# Patient Record
Sex: FEMALE | Race: WHITE | NOT HISPANIC OR LATINO
[De-identification: names, ages, dates, MRNs, and addresses within clinical notes are randomized per-mention and may not be internally consistent; named-entity substitution may affect disease eponyms.]

---

## 2017-08-01 ENCOUNTER — APPOINTMENT (OUTPATIENT)
Dept: NEUROSURGERY | Facility: CLINIC | Age: 72
End: 2017-08-01
Payer: MEDICARE

## 2017-08-01 VITALS
BODY MASS INDEX: 22.15 KG/M2 | DIASTOLIC BLOOD PRESSURE: 65 MMHG | HEIGHT: 63 IN | SYSTOLIC BLOOD PRESSURE: 140 MMHG | WEIGHT: 125 LBS

## 2017-08-01 PROBLEM — Z00.00 ENCOUNTER FOR PREVENTIVE HEALTH EXAMINATION: Status: ACTIVE | Noted: 2017-08-01

## 2017-08-01 PROCEDURE — 99204 OFFICE O/P NEW MOD 45 MIN: CPT

## 2017-08-01 PROCEDURE — 99214 OFFICE O/P EST MOD 30 MIN: CPT

## 2017-08-01 RX ORDER — METRONIDAZOLE 500 MG/1
500 TABLET ORAL
Refills: 0 | Status: ACTIVE | COMMUNITY

## 2017-08-01 RX ORDER — HYDROCODONE BITARTRATE AND ACETAMINOPHEN 7.5; 3 MG/1; MG/1
7.5-3 TABLET ORAL
Refills: 0 | Status: ACTIVE | COMMUNITY

## 2017-08-01 RX ORDER — DICLOFENAC SODIUM 10 MG/G
1 GEL TOPICAL
Qty: 1 | Refills: 0 | Status: ACTIVE | COMMUNITY
Start: 2017-08-01 | End: 1900-01-01

## 2017-08-01 RX ORDER — FLUCONAZOLE 200 MG/1
200 TABLET ORAL
Refills: 0 | Status: ACTIVE | COMMUNITY

## 2017-08-01 RX ORDER — RIFAXIMIN 550 MG/1
550 TABLET ORAL
Refills: 0 | Status: ACTIVE | COMMUNITY

## 2017-08-01 RX ORDER — ZOLPIDEM TARTRATE 5 MG/1
5 TABLET, FILM COATED ORAL
Refills: 0 | Status: ACTIVE | COMMUNITY

## 2017-08-01 RX ORDER — CARBAMAZEPINE 200 MG/1
200 TABLET, EXTENDED RELEASE ORAL
Refills: 0 | Status: ACTIVE | COMMUNITY

## 2017-08-01 RX ORDER — LEVOTHYROXINE SODIUM 0.03 MG/1
25 TABLET ORAL
Refills: 0 | Status: ACTIVE | COMMUNITY

## 2017-08-01 RX ORDER — MINOCYCLINE HYDROCHLORIDE 100 MG/1
100 CAPSULE, COATED PELLETS ORAL
Refills: 0 | Status: ACTIVE | COMMUNITY

## 2017-08-01 RX ORDER — DOXYCYCLINE 150 MG/1
150 TABLET, FILM COATED ORAL
Refills: 0 | Status: ACTIVE | COMMUNITY

## 2017-08-01 RX ORDER — ENOXAPARIN SODIUM 80 MG/.8ML
80 INJECTION SUBCUTANEOUS
Refills: 0 | Status: ACTIVE | COMMUNITY

## 2017-08-01 RX ORDER — PILOCARPINE HYDROCHLORIDE 5 MG/1
5 TABLET, FILM COATED ORAL
Refills: 0 | Status: ACTIVE | COMMUNITY

## 2017-08-01 RX ORDER — AZITHROMYCIN DIHYDRATE 250 MG/1
250 TABLET, FILM COATED ORAL
Refills: 0 | Status: ACTIVE | COMMUNITY

## 2017-08-01 RX ORDER — MONTELUKAST SODIUM 10 MG/1
10 TABLET, FILM COATED ORAL
Refills: 0 | Status: ACTIVE | COMMUNITY

## 2017-08-01 RX ORDER — NYSTATIN 500000 [USP'U]/1
500000 TABLET, FILM COATED ORAL
Refills: 0 | Status: ACTIVE | COMMUNITY

## 2017-08-01 RX ORDER — DIAZEPAM 5 MG/1
5 TABLET ORAL
Refills: 0 | Status: ACTIVE | COMMUNITY

## 2017-08-01 RX ORDER — SOMATROPIN 0.2MG/0.25
0.2 KIT SUBCUTANEOUS
Refills: 0 | Status: ACTIVE | COMMUNITY

## 2017-08-01 RX ORDER — METHOCARBAMOL 750 MG/1
750 TABLET, FILM COATED ORAL
Refills: 0 | Status: ACTIVE | COMMUNITY

## 2017-09-05 ENCOUNTER — APPOINTMENT (OUTPATIENT)
Dept: NEUROSURGERY | Facility: CLINIC | Age: 72
End: 2017-09-05
Payer: MEDICARE

## 2017-09-05 VITALS
BODY MASS INDEX: 22.15 KG/M2 | HEIGHT: 63 IN | DIASTOLIC BLOOD PRESSURE: 60 MMHG | SYSTOLIC BLOOD PRESSURE: 145 MMHG | WEIGHT: 125 LBS

## 2017-09-05 DIAGNOSIS — M48.50XA COLLAPSED VERTEBRA, NOT ELSEWHERE CLASSIFIED, SITE UNSPECIFIED, INITIAL ENCOUNTER FOR FRACTURE: ICD-10-CM

## 2017-09-05 DIAGNOSIS — M81.0 AGE-RELATED OSTEOPOROSIS W/OUT CURRENT PATHOLOGICAL FRACTURE: ICD-10-CM

## 2017-09-05 DIAGNOSIS — M54.16 RADICULOPATHY, LUMBAR REGION: ICD-10-CM

## 2017-09-05 PROCEDURE — 99214 OFFICE O/P EST MOD 30 MIN: CPT

## 2017-09-06 ENCOUNTER — OUTPATIENT (OUTPATIENT)
Dept: OUTPATIENT SERVICES | Facility: HOSPITAL | Age: 72
LOS: 1 days | End: 2017-09-06

## 2017-09-20 ENCOUNTER — OUTPATIENT (OUTPATIENT)
Dept: OUTPATIENT SERVICES | Facility: HOSPITAL | Age: 72
LOS: 1 days | End: 2017-09-20

## 2017-09-27 ENCOUNTER — OUTPATIENT (OUTPATIENT)
Dept: OUTPATIENT SERVICES | Facility: HOSPITAL | Age: 72
LOS: 1 days | End: 2017-09-27

## 2017-10-04 ENCOUNTER — OUTPATIENT (OUTPATIENT)
Dept: OUTPATIENT SERVICES | Facility: HOSPITAL | Age: 72
LOS: 1 days | End: 2017-10-04

## 2022-10-13 ENCOUNTER — NON-APPOINTMENT (OUTPATIENT)
Age: 77
End: 2022-10-13

## 2022-10-17 ENCOUNTER — APPOINTMENT (OUTPATIENT)
Dept: NEUROSURGERY | Facility: CLINIC | Age: 77
End: 2022-10-17

## 2024-01-18 ENCOUNTER — APPOINTMENT (OUTPATIENT)
Dept: ORTHOPEDIC SURGERY | Facility: CLINIC | Age: 79
End: 2024-01-18
Payer: MEDICARE

## 2024-01-18 ENCOUNTER — APPOINTMENT (OUTPATIENT)
Dept: ORTHOPEDIC SURGERY | Facility: CLINIC | Age: 79
End: 2024-01-18

## 2024-01-18 VITALS — BODY MASS INDEX: 16.22 KG/M2 | HEIGHT: 64 IN | WEIGHT: 95 LBS

## 2024-01-18 PROCEDURE — 73060 X-RAY EXAM OF HUMERUS: CPT

## 2024-01-18 PROCEDURE — 99203 OFFICE O/P NEW LOW 30 MIN: CPT

## 2024-01-18 NOTE — ADDENDUM
[FreeTextEntry1] :  I, Kristyn Santoro wrote this note acting as a scribe for Dr. Keith Martinez on Jan 18, 2024.

## 2024-01-18 NOTE — DISCUSSION/SUMMARY
[de-identified] :  The underlying pathophysiology was reviewed with the patient. XR films were reviewed with the patient. Discussed at length the nature of the patient's condition. Risks and benefits discussed for surgery. Nature of the operation reviewed. Post-operative recovery and patient experience were reviewed.  The patient was informed that the 2 options available are to continue with the brace or to have surgical repair of the nonunion. She is at significant of risks  of the surgery including risk of infection, nonunion, malunion, radial nerve palsy among many other risks.  She wishes to proceed with surgery.  The patient was encouraged to take Calcium Citrate, Vitamin D3 and Vitamin C along with a high calcium diet is advised to promote bone health and healing.  All questions answered, understanding verbalized. Patient in agreement with plan of care.

## 2024-01-18 NOTE — PHYSICAL EXAM
[de-identified] :  Patient is WDWN, alert, and in no acute distress. Breathing is unlabored. She is grossly oriented to person, place, and time.   She was accompanied by Her  today.   She ambulates with a walker.  Right humerus:  Present in a humeral brace which was removed Obvious midshaft deformity with motion at fracture site Tenderness and edema noted Range of Motion: Shoulder 0-90 flexion Elbow  Wrist/hand FROM Sensation is normal [de-identified] :  AP and lateral views of the Right humerus were obtained today and revealed nonunion humeral shaft fracture.

## 2024-01-18 NOTE — HISTORY OF PRESENT ILLNESS
[de-identified] : Pt is a 77 y/o female with right humeral shaft fracture.  She fell in June and had a right displaced humeral shaft fracture.  She went to [facility] ED where the fracture was reduced.  A splint was applied.  She followed up with Dr. Dominguez.  She was treated in a fracture brace.  She states that she was doing well until October.  The fracture did not heal.  She was treated with an exogen bone stimulator but it did not help.  She presents today for surgical consultation. She is chronic narcotic pain medication 3-4x a day

## 2024-01-18 NOTE — END OF VISIT
[FreeTextEntry3] : All medical record entries made by the Scribe were at my, Dr. Keith Martinez MD., direction and personally dictated by me on 01/18/2024. I have personally reviewed the chart and agree that the record accurately reflects my personal performance of the history, physical exam, assessment and plan.

## 2024-01-23 ENCOUNTER — OUTPATIENT (OUTPATIENT)
Dept: OUTPATIENT SERVICES | Facility: HOSPITAL | Age: 79
LOS: 1 days | End: 2024-01-23
Payer: MEDICARE

## 2024-01-23 VITALS
WEIGHT: 95.02 LBS | OXYGEN SATURATION: 99 % | HEIGHT: 58 IN | TEMPERATURE: 98 F | RESPIRATION RATE: 12 BRPM | HEART RATE: 65 BPM | DIASTOLIC BLOOD PRESSURE: 67 MMHG | SYSTOLIC BLOOD PRESSURE: 134 MMHG

## 2024-01-23 DIAGNOSIS — S42.301A UNSPECIFIED FRACTURE OF SHAFT OF HUMERUS, RIGHT ARM, INITIAL ENCOUNTER FOR CLOSED FRACTURE: ICD-10-CM

## 2024-01-23 DIAGNOSIS — Z01.818 ENCOUNTER FOR OTHER PREPROCEDURAL EXAMINATION: ICD-10-CM

## 2024-01-23 DIAGNOSIS — S42.309A UNSPECIFIED FRACTURE OF SHAFT OF HUMERUS, UNSPECIFIED ARM, INITIAL ENCOUNTER FOR CLOSED FRACTURE: ICD-10-CM

## 2024-01-23 DIAGNOSIS — Z90.49 ACQUIRED ABSENCE OF OTHER SPECIFIED PARTS OF DIGESTIVE TRACT: Chronic | ICD-10-CM

## 2024-01-23 DIAGNOSIS — Z98.890 OTHER SPECIFIED POSTPROCEDURAL STATES: Chronic | ICD-10-CM

## 2024-01-23 DIAGNOSIS — Z90.89 ACQUIRED ABSENCE OF OTHER ORGANS: Chronic | ICD-10-CM

## 2024-01-23 DIAGNOSIS — Z90.710 ACQUIRED ABSENCE OF BOTH CERVIX AND UTERUS: Chronic | ICD-10-CM

## 2024-01-23 DIAGNOSIS — Z90.5 ACQUIRED ABSENCE OF KIDNEY: Chronic | ICD-10-CM

## 2024-01-23 LAB
ALBUMIN SERPL ELPH-MCNC: 2.8 G/DL — LOW (ref 3.3–5)
ALP SERPL-CCNC: 156 U/L — HIGH (ref 30–120)
ALT FLD-CCNC: 26 U/L — SIGNIFICANT CHANGE UP (ref 10–60)
ANION GAP SERPL CALC-SCNC: 7 MMOL/L — SIGNIFICANT CHANGE UP (ref 5–17)
AST SERPL-CCNC: 22 U/L — SIGNIFICANT CHANGE UP (ref 10–40)
BILIRUB SERPL-MCNC: 0.2 MG/DL — SIGNIFICANT CHANGE UP (ref 0.2–1.2)
BLD GP AB SCN SERPL QL: SIGNIFICANT CHANGE UP
BUN SERPL-MCNC: 31 MG/DL — HIGH (ref 7–23)
CALCIUM SERPL-MCNC: 8.9 MG/DL — SIGNIFICANT CHANGE UP (ref 8.4–10.5)
CHLORIDE SERPL-SCNC: 101 MMOL/L — SIGNIFICANT CHANGE UP (ref 96–108)
CO2 SERPL-SCNC: 29 MMOL/L — SIGNIFICANT CHANGE UP (ref 22–31)
CREAT SERPL-MCNC: 0.78 MG/DL — SIGNIFICANT CHANGE UP (ref 0.5–1.3)
EGFR: 78 ML/MIN/1.73M2 — SIGNIFICANT CHANGE UP
GLUCOSE SERPL-MCNC: 94 MG/DL — SIGNIFICANT CHANGE UP (ref 70–99)
POTASSIUM SERPL-MCNC: 4.5 MMOL/L — SIGNIFICANT CHANGE UP (ref 3.5–5.3)
POTASSIUM SERPL-SCNC: 4.5 MMOL/L — SIGNIFICANT CHANGE UP (ref 3.5–5.3)
PROT SERPL-MCNC: 6.9 G/DL — SIGNIFICANT CHANGE UP (ref 6–8.3)
SODIUM SERPL-SCNC: 137 MMOL/L — SIGNIFICANT CHANGE UP (ref 135–145)

## 2024-01-23 PROCEDURE — G0463: CPT

## 2024-01-23 PROCEDURE — 93005 ELECTROCARDIOGRAM TRACING: CPT

## 2024-01-23 PROCEDURE — 93010 ELECTROCARDIOGRAM REPORT: CPT

## 2024-01-23 RX ORDER — RIFAXIMIN 200 MG/1
1 TABLET ORAL
Refills: 0 | DISCHARGE

## 2024-01-23 NOTE — H&P PST ADULT - HISTORY OF PRESENT ILLNESS
77 yo female reports pain in right humerus s/p fall injury 7/2023 for which she was treated with arm brace.  Her pain rating is 7/10 at worst when she is laying down.  She is scheduled for open reduction internal fixation right humeral nonunion with allograft and iliac crest bone graft on 1/26/2024 79 yo female reports pain in right humerus s/p fall injury 7/2023 for which she was treated with arm brace.  Her pain rating is 7/10 at worst when she is laying down.  She is scheduled for open reduction internal fixation right humeral nonunion with allograft and iliac crest bone graft on 1/26/2024 @ [facility]

## 2024-01-23 NOTE — H&P PST ADULT - NSICDXPASTMEDICALHX_GEN_ALL_CORE_FT
PAST MEDICAL HISTORY:  Acquired absence of right kidney     Allergy to gluten     Cancer of kidney     Chronic lower back pain     Fracture of shaft of right humerus     GERD (gastroesophageal reflux disease)     Hearing loss     History of antiphospholipid syndrome     History of hypercoagulable state     History of Lyme disease     Hypothyroidism     IBS (irritable bowel syndrome)     Lactose intolerance     Opioid use     Osteoporosis     Sjogrens syndrome     TIA due to embolism     Uses roller walker

## 2024-01-23 NOTE — H&P PST ADULT - NSICDXFAMILYHX_GEN_ALL_CORE_FT
FAMILY HISTORY:  Father  Still living? No  Family history of heart disease, Age at diagnosis: Age Unknown    Mother  Still living? No  Family history of COPD (chronic obstructive pulmonary disease), Age at diagnosis: Age Unknown    Sibling  Still living? No  Family history of heart disease, Age at diagnosis: Age Unknown

## 2024-01-23 NOTE — H&P PST ADULT - PROBLEM SELECTOR PLAN 1
ORIF right humeral non allograft and iliac crest bone marrow aspirate is plannned for 1/26/2024  Diagnostic testing performed  Patient was asked to obtain medical clearance  patient has obtained hematology clearance and instructions for lovenox  Patient is advised to consult pain management medication prescriber for instructions for post op pain relief  Pre op instructions were reviewed and given in writing  Best wishes offered

## 2024-01-23 NOTE — H&P PST ADULT - ASSESSMENT
chest pain/fever 77 yo female is scheduled for open reduction internal fixation humeral nonunion with allograft and iliac crest bone marrow aspirate on 1/26/2024

## 2024-01-23 NOTE — H&P PST ADULT - NSICDXPASTSURGICALHX_GEN_ALL_CORE_FT
PAST SURGICAL HISTORY:  History of appendectomy     History of hysterectomy     History of microdiscectomy     History of nephrectomy     History of open reduction and internal fixation (ORIF) procedure     History of tonsillectomy     Status post de Quervain's release surgery

## 2024-01-25 ENCOUNTER — TRANSCRIPTION ENCOUNTER (OUTPATIENT)
Age: 79
End: 2024-01-25

## 2024-01-26 ENCOUNTER — APPOINTMENT (OUTPATIENT)
Dept: ORTHOPEDIC SURGERY | Facility: HOSPITAL | Age: 79
End: 2024-01-26

## 2024-01-26 ENCOUNTER — RESULT REVIEW (OUTPATIENT)
Age: 79
End: 2024-01-26

## 2024-01-26 ENCOUNTER — OUTPATIENT (OUTPATIENT)
Dept: OUTPATIENT SERVICES | Facility: HOSPITAL | Age: 79
LOS: 1 days | End: 2024-01-26
Payer: MEDICARE

## 2024-01-26 ENCOUNTER — TRANSCRIPTION ENCOUNTER (OUTPATIENT)
Age: 79
End: 2024-01-26

## 2024-01-26 VITALS
DIASTOLIC BLOOD PRESSURE: 75 MMHG | HEART RATE: 96 BPM | RESPIRATION RATE: 16 BRPM | SYSTOLIC BLOOD PRESSURE: 127 MMHG | TEMPERATURE: 98 F | OXYGEN SATURATION: 100 %

## 2024-01-26 DIAGNOSIS — Z01.818 ENCOUNTER FOR OTHER PREPROCEDURAL EXAMINATION: ICD-10-CM

## 2024-01-26 DIAGNOSIS — Z98.890 OTHER SPECIFIED POSTPROCEDURAL STATES: Chronic | ICD-10-CM

## 2024-01-26 DIAGNOSIS — S42.309A UNSPECIFIED FRACTURE OF SHAFT OF HUMERUS, UNSPECIFIED ARM, INITIAL ENCOUNTER FOR CLOSED FRACTURE: ICD-10-CM

## 2024-01-26 DIAGNOSIS — Z90.5 ACQUIRED ABSENCE OF KIDNEY: Chronic | ICD-10-CM

## 2024-01-26 DIAGNOSIS — Z90.49 ACQUIRED ABSENCE OF OTHER SPECIFIED PARTS OF DIGESTIVE TRACT: Chronic | ICD-10-CM

## 2024-01-26 DIAGNOSIS — Z90.710 ACQUIRED ABSENCE OF BOTH CERVIX AND UTERUS: Chronic | ICD-10-CM

## 2024-01-26 DIAGNOSIS — Z90.89 ACQUIRED ABSENCE OF OTHER ORGANS: Chronic | ICD-10-CM

## 2024-01-26 LAB
ABO RH CONFIRMATION: SIGNIFICANT CHANGE UP
HCT VFR BLD CALC: 41.9 % — SIGNIFICANT CHANGE UP (ref 34.5–45)
HGB BLD-MCNC: 13.9 G/DL — SIGNIFICANT CHANGE UP (ref 11.5–15.5)
MCHC RBC-ENTMCNC: 29.7 PG — SIGNIFICANT CHANGE UP (ref 27–34)
MCHC RBC-ENTMCNC: 33.2 GM/DL — SIGNIFICANT CHANGE UP (ref 32–36)
MCV RBC AUTO: 89.5 FL — SIGNIFICANT CHANGE UP (ref 80–100)
NRBC # BLD: 0 /100 WBCS — SIGNIFICANT CHANGE UP (ref 0–0)
PLATELET # BLD AUTO: 244 K/UL — SIGNIFICANT CHANGE UP (ref 150–400)
RBC # BLD: 4.68 M/UL — SIGNIFICANT CHANGE UP (ref 3.8–5.2)
RBC # FLD: 17.2 % — HIGH (ref 10.3–14.5)
WBC # BLD: 10.76 K/UL — HIGH (ref 3.8–10.5)
WBC # FLD AUTO: 10.76 K/UL — HIGH (ref 3.8–10.5)

## 2024-01-26 PROCEDURE — 24430 RPR NON/MAL HUMERUS WO GRAFT: CPT | Mod: AS,RT

## 2024-01-26 PROCEDURE — 88305 TISSUE EXAM BY PATHOLOGIST: CPT | Mod: 26

## 2024-01-26 PROCEDURE — 24435 RPR NON/MAL HUM WITH AGRFT: CPT | Mod: 22,RT

## 2024-01-26 PROCEDURE — 73060 X-RAY EXAM OF HUMERUS: CPT | Mod: 26,RT

## 2024-01-26 DEVICE — SCREW LOKG SLF-TPNG W/ STARDRIVE RECESS 3.5X38MM: Type: IMPLANTABLE DEVICE | Site: RIGHT | Status: FUNCTIONAL

## 2024-01-26 DEVICE — SCREW CORT S-T 3.5X30MM: Type: IMPLANTABLE DEVICE | Site: RIGHT | Status: FUNCTIONAL

## 2024-01-26 DEVICE — SCREW LOKG SLF-TPNG W/ STARDRIVE RECESS 3.5X30MM: Type: IMPLANTABLE DEVICE | Site: RIGHT | Status: FUNCTIONAL

## 2024-01-26 DEVICE — SCREW CORT S-T 3.5X34MM: Type: IMPLANTABLE DEVICE | Site: RIGHT | Status: FUNCTIONAL

## 2024-01-26 DEVICE — SCREW CORT S-T 3.5X26MM: Type: IMPLANTABLE DEVICE | Site: RIGHT | Status: FUNCTIONAL

## 2024-01-26 DEVICE — SCREW CORT S-T 3.5X40MM: Type: IMPLANTABLE DEVICE | Site: RIGHT | Status: FUNCTIONAL

## 2024-01-26 DEVICE — SCREW CORT S-T 3.5X36MM: Type: IMPLANTABLE DEVICE | Site: RIGHT | Status: FUNCTIONAL

## 2024-01-26 DEVICE — SCREW LOKG SLF-TPNG W/ STARDRIVE RECESS 3.5X24MM: Type: IMPLANTABLE DEVICE | Site: RIGHT | Status: FUNCTIONAL

## 2024-01-26 DEVICE — IMPLANTABLE DEVICE: Type: IMPLANTABLE DEVICE | Site: RIGHT | Status: FUNCTIONAL

## 2024-01-26 DEVICE — SCREW LOKG SLF-TPNG W/ STARDRIVE RECESS 3.5X22MM: Type: IMPLANTABLE DEVICE | Site: RIGHT | Status: FUNCTIONAL

## 2024-01-26 DEVICE — SCREW LOKG SLF-TPNG W/ STARDRIVE RECESS 3.5X14MM: Type: IMPLANTABLE DEVICE | Site: RIGHT | Status: FUNCTIONAL

## 2024-01-26 DEVICE — SCREW CORT S-T 3.5X24MM: Type: IMPLANTABLE DEVICE | Site: RIGHT | Status: FUNCTIONAL

## 2024-01-26 DEVICE — PLATE LCP 12H 3.5X163MM: Type: IMPLANTABLE DEVICE | Site: RIGHT | Status: FUNCTIONAL

## 2024-01-26 DEVICE — SCREW CORT S-T 3.5X32MM: Type: IMPLANTABLE DEVICE | Site: RIGHT | Status: FUNCTIONAL

## 2024-01-26 DEVICE — SURGICEL NU-KNIT 6 X 9": Type: IMPLANTABLE DEVICE | Site: RIGHT | Status: FUNCTIONAL

## 2024-01-26 DEVICE — SCR LOK S-T 3.5X28MM SS: Type: IMPLANTABLE DEVICE | Site: RIGHT | Status: FUNCTIONAL

## 2024-01-26 DEVICE — SCREW LOKG SLF-TPNG W/ STARDRIVE RECESS 3.5X20MM: Type: IMPLANTABLE DEVICE | Site: RIGHT | Status: FUNCTIONAL

## 2024-01-26 DEVICE — SCREW LOKG SLF-TPNG W/ STARDRIVE RECESS 3.5X26MM: Type: IMPLANTABLE DEVICE | Site: RIGHT | Status: FUNCTIONAL

## 2024-01-26 RX ORDER — CARBAMAZEPINE 200 MG
200 TABLET ORAL THREE TIMES A DAY
Refills: 0 | Status: DISCONTINUED | OUTPATIENT
Start: 2024-01-26 | End: 2024-02-09

## 2024-01-26 RX ORDER — FENTANYL CITRATE 50 UG/ML
1 INJECTION INTRAVENOUS
Refills: 0 | DISCHARGE

## 2024-01-26 RX ORDER — LEVOTHYROXINE SODIUM 125 MCG
88 TABLET ORAL DAILY
Refills: 0 | Status: DISCONTINUED | OUTPATIENT
Start: 2024-01-26 | End: 2024-02-09

## 2024-01-26 RX ORDER — DESLORATADINE 5 MG/1
1 TABLET, FILM COATED ORAL
Refills: 0 | DISCHARGE

## 2024-01-26 RX ORDER — HYDROMORPHONE HYDROCHLORIDE 2 MG/ML
0.5 INJECTION INTRAMUSCULAR; INTRAVENOUS; SUBCUTANEOUS
Refills: 0 | Status: DISCONTINUED | OUTPATIENT
Start: 2024-01-26 | End: 2024-01-27

## 2024-01-26 RX ORDER — ENOXAPARIN SODIUM 100 MG/ML
60 INJECTION SUBCUTANEOUS EVERY 24 HOURS
Refills: 0 | Status: DISCONTINUED | OUTPATIENT
Start: 2024-01-27 | End: 2024-02-09

## 2024-01-26 RX ORDER — ACETAMINOPHEN 500 MG
600 TABLET ORAL ONCE
Refills: 0 | Status: COMPLETED | OUTPATIENT
Start: 2024-01-27 | End: 2024-01-27

## 2024-01-26 RX ORDER — CALCITONIN SALMON 200 [IU]/ML
1 INJECTION, SOLUTION INTRAMUSCULAR
Refills: 0 | DISCHARGE

## 2024-01-26 RX ORDER — ONDANSETRON 8 MG/1
4 TABLET, FILM COATED ORAL EVERY 6 HOURS
Refills: 0 | Status: DISCONTINUED | OUTPATIENT
Start: 2024-01-26 | End: 2024-02-09

## 2024-01-26 RX ORDER — ONDANSETRON 8 MG/1
4 TABLET, FILM COATED ORAL ONCE
Refills: 0 | Status: DISCONTINUED | OUTPATIENT
Start: 2024-01-26 | End: 2024-01-27

## 2024-01-26 RX ORDER — CEFAZOLIN SODIUM 1 G
2000 VIAL (EA) INJECTION EVERY 8 HOURS
Refills: 0 | Status: COMPLETED | OUTPATIENT
Start: 2024-01-27 | End: 2024-01-27

## 2024-01-26 RX ORDER — CEFAZOLIN SODIUM 1 G
2000 VIAL (EA) INJECTION ONCE
Refills: 0 | Status: COMPLETED | OUTPATIENT
Start: 2024-01-26 | End: 2024-01-26

## 2024-01-26 RX ORDER — CARBAMAZEPINE 200 MG
1 TABLET ORAL
Refills: 0 | DISCHARGE

## 2024-01-26 RX ORDER — CHLORHEXIDINE GLUCONATE 213 G/1000ML
1 SOLUTION TOPICAL ONCE
Refills: 0 | Status: COMPLETED | OUTPATIENT
Start: 2024-01-26 | End: 2024-01-26

## 2024-01-26 RX ORDER — TRANEXAMIC ACID 100 MG/ML
1000 INJECTION, SOLUTION INTRAVENOUS ONCE
Refills: 0 | Status: DISCONTINUED | OUTPATIENT
Start: 2024-01-26 | End: 2024-02-09

## 2024-01-26 RX ORDER — PRIMIDONE 250 MG/1
250 TABLET ORAL EVERY 12 HOURS
Refills: 0 | Status: DISCONTINUED | OUTPATIENT
Start: 2024-01-26 | End: 2024-02-09

## 2024-01-26 RX ORDER — PANTOPRAZOLE SODIUM 20 MG/1
40 TABLET, DELAYED RELEASE ORAL
Refills: 0 | Status: DISCONTINUED | OUTPATIENT
Start: 2024-01-26 | End: 2024-02-09

## 2024-01-26 RX ORDER — LIOTHYRONINE SODIUM 25 UG/1
25 TABLET ORAL DAILY
Refills: 0 | Status: DISCONTINUED | OUTPATIENT
Start: 2024-01-26 | End: 2024-02-09

## 2024-01-26 RX ORDER — LIOTHYRONINE SODIUM 25 UG/1
1 TABLET ORAL
Refills: 0 | DISCHARGE

## 2024-01-26 RX ORDER — OXYCODONE HYDROCHLORIDE 5 MG/1
5 TABLET ORAL
Refills: 0 | Status: DISCONTINUED | OUTPATIENT
Start: 2024-01-26 | End: 2024-01-28

## 2024-01-26 RX ORDER — SODIUM CHLORIDE 9 MG/ML
1000 INJECTION, SOLUTION INTRAVENOUS
Refills: 0 | Status: DISCONTINUED | OUTPATIENT
Start: 2024-01-26 | End: 2024-02-09

## 2024-01-26 RX ORDER — BACLOFEN 100 %
1 POWDER (GRAM) MISCELLANEOUS
Refills: 0 | DISCHARGE

## 2024-01-26 RX ORDER — SODIUM CHLORIDE 9 MG/ML
1000 INJECTION, SOLUTION INTRAVENOUS
Refills: 0 | Status: DISCONTINUED | OUTPATIENT
Start: 2024-01-26 | End: 2024-01-27

## 2024-01-26 RX ORDER — POTASSIUM CHLORIDE 20 MEQ
1 PACKET (EA) ORAL
Refills: 0 | DISCHARGE

## 2024-01-26 RX ORDER — HYDROMORPHONE HYDROCHLORIDE 2 MG/ML
0.2 INJECTION INTRAMUSCULAR; INTRAVENOUS; SUBCUTANEOUS
Refills: 0 | Status: DISCONTINUED | OUTPATIENT
Start: 2024-01-26 | End: 2024-01-27

## 2024-01-26 RX ORDER — ENOXAPARIN SODIUM 100 MG/ML
100 INJECTION SUBCUTANEOUS
Refills: 0 | DISCHARGE

## 2024-01-26 RX ORDER — PILOCARPINE HCL
1 CRYSTALS MISCELLANEOUS
Refills: 0 | DISCHARGE

## 2024-01-26 RX ORDER — RIFAXIMIN 200 MG/1
1 TABLET ORAL
Refills: 0 | DISCHARGE

## 2024-01-26 RX ORDER — NYSTATIN 500MM UNIT
1 POWDER (EA) MISCELLANEOUS
Refills: 0 | DISCHARGE

## 2024-01-26 RX ORDER — NYSTATIN 500MM UNIT
500000 POWDER (EA) MISCELLANEOUS DAILY
Refills: 0 | Status: DISCONTINUED | OUTPATIENT
Start: 2024-01-26 | End: 2024-02-09

## 2024-01-26 RX ORDER — APREPITANT 80 MG/1
40 CAPSULE ORAL ONCE
Refills: 0 | Status: COMPLETED | OUTPATIENT
Start: 2024-01-26 | End: 2024-01-26

## 2024-01-26 RX ORDER — ACETAMINOPHEN 500 MG
650 TABLET ORAL EVERY 8 HOURS
Refills: 0 | Status: DISCONTINUED | OUTPATIENT
Start: 2024-01-27 | End: 2024-02-09

## 2024-01-26 RX ORDER — LEVOTHYROXINE SODIUM 125 MCG
1 TABLET ORAL
Refills: 0 | DISCHARGE

## 2024-01-26 RX ORDER — HYDROMORPHONE HYDROCHLORIDE 2 MG/ML
0.5 INJECTION INTRAMUSCULAR; INTRAVENOUS; SUBCUTANEOUS
Refills: 0 | Status: DISCONTINUED | OUTPATIENT
Start: 2024-01-26 | End: 2024-01-26

## 2024-01-26 RX ORDER — LORATADINE 10 MG/1
10 TABLET ORAL DAILY
Refills: 0 | Status: DISCONTINUED | OUTPATIENT
Start: 2024-01-26 | End: 2024-02-09

## 2024-01-26 RX ORDER — FAMOTIDINE 10 MG/ML
1 INJECTION INTRAVENOUS
Refills: 0 | DISCHARGE

## 2024-01-26 RX ORDER — OXYCODONE HYDROCHLORIDE 5 MG/1
10 TABLET ORAL
Refills: 0 | Status: DISCONTINUED | OUTPATIENT
Start: 2024-01-26 | End: 2024-01-27

## 2024-01-26 RX ORDER — PRIMIDONE 250 MG/1
1 TABLET ORAL
Refills: 0 | DISCHARGE

## 2024-01-26 RX ADMIN — APREPITANT 40 MILLIGRAM(S): 80 CAPSULE ORAL at 12:16

## 2024-01-26 RX ADMIN — CHLORHEXIDINE GLUCONATE 1 APPLICATION(S): 213 SOLUTION TOPICAL at 12:16

## 2024-01-26 RX ADMIN — HYDROMORPHONE HYDROCHLORIDE 0.2 MILLIGRAM(S): 2 INJECTION INTRAMUSCULAR; INTRAVENOUS; SUBCUTANEOUS at 21:01

## 2024-01-26 NOTE — ASU PREOP CHECKLIST - ADVANCE DIRECTIVE ADDRESSED/READDRESSED
Attempted to call family with update in plan.   Emperatriz Mcginnis MD  Colon And Rectal Surgery Fellow  [phone]    6/3/2017  2:01 PM       done

## 2024-01-26 NOTE — ASU PATIENT PROFILE, ADULT - FALL HARM RISK - RISK INTERVENTIONS

## 2024-01-26 NOTE — BRIEF OPERATIVE NOTE - NSICDXBRIEFPROCEDURE_GEN_ALL_CORE_FT
PROCEDURES:  Open reduction and internal fixation (ORIF) of fracture nonunion of humerus 26-Jan-2024 15:00:27  Franck Henderson

## 2024-01-27 ENCOUNTER — TRANSCRIPTION ENCOUNTER (OUTPATIENT)
Age: 79
End: 2024-01-27

## 2024-01-27 LAB
ANION GAP SERPL CALC-SCNC: 8 MMOL/L — SIGNIFICANT CHANGE UP (ref 5–17)
BASOPHILS # BLD AUTO: 0.07 K/UL — SIGNIFICANT CHANGE UP (ref 0–0.2)
BASOPHILS NFR BLD AUTO: 0.9 % — SIGNIFICANT CHANGE UP (ref 0–2)
BUN SERPL-MCNC: 21 MG/DL — SIGNIFICANT CHANGE UP (ref 7–23)
CALCIUM SERPL-MCNC: 8.5 MG/DL — SIGNIFICANT CHANGE UP (ref 8.4–10.5)
CHLORIDE SERPL-SCNC: 102 MMOL/L — SIGNIFICANT CHANGE UP (ref 96–108)
CO2 SERPL-SCNC: 26 MMOL/L — SIGNIFICANT CHANGE UP (ref 22–31)
CREAT SERPL-MCNC: 0.72 MG/DL — SIGNIFICANT CHANGE UP (ref 0.5–1.3)
EGFR: 86 ML/MIN/1.73M2 — SIGNIFICANT CHANGE UP
EOSINOPHIL # BLD AUTO: 0.08 K/UL — SIGNIFICANT CHANGE UP (ref 0–0.5)
EOSINOPHIL NFR BLD AUTO: 1 % — SIGNIFICANT CHANGE UP (ref 0–6)
GLUCOSE SERPL-MCNC: 105 MG/DL — HIGH (ref 70–99)
HCT VFR BLD CALC: 35.3 % — SIGNIFICANT CHANGE UP (ref 34.5–45)
HGB BLD-MCNC: 11.6 G/DL — SIGNIFICANT CHANGE UP (ref 11.5–15.5)
IMM GRANULOCYTES NFR BLD AUTO: 0.4 % — SIGNIFICANT CHANGE UP (ref 0–0.9)
LYMPHOCYTES # BLD AUTO: 0.82 K/UL — LOW (ref 1–3.3)
LYMPHOCYTES # BLD AUTO: 10.2 % — LOW (ref 13–44)
MCHC RBC-ENTMCNC: 29.7 PG — SIGNIFICANT CHANGE UP (ref 27–34)
MCHC RBC-ENTMCNC: 32.9 GM/DL — SIGNIFICANT CHANGE UP (ref 32–36)
MCV RBC AUTO: 90.3 FL — SIGNIFICANT CHANGE UP (ref 80–100)
MONOCYTES # BLD AUTO: 1.28 K/UL — HIGH (ref 0–0.9)
MONOCYTES NFR BLD AUTO: 15.9 % — HIGH (ref 2–14)
NEUTROPHILS # BLD AUTO: 5.75 K/UL — SIGNIFICANT CHANGE UP (ref 1.8–7.4)
NEUTROPHILS NFR BLD AUTO: 71.6 % — SIGNIFICANT CHANGE UP (ref 43–77)
NRBC # BLD: 0 /100 WBCS — SIGNIFICANT CHANGE UP (ref 0–0)
PLATELET # BLD AUTO: 225 K/UL — SIGNIFICANT CHANGE UP (ref 150–400)
POTASSIUM SERPL-MCNC: 4.8 MMOL/L — SIGNIFICANT CHANGE UP (ref 3.5–5.3)
POTASSIUM SERPL-SCNC: 4.8 MMOL/L — SIGNIFICANT CHANGE UP (ref 3.5–5.3)
RBC # BLD: 3.91 M/UL — SIGNIFICANT CHANGE UP (ref 3.8–5.2)
RBC # FLD: 18.1 % — HIGH (ref 10.3–14.5)
SODIUM SERPL-SCNC: 136 MMOL/L — SIGNIFICANT CHANGE UP (ref 135–145)
WBC # BLD: 8.03 K/UL — SIGNIFICANT CHANGE UP (ref 3.8–10.5)
WBC # FLD AUTO: 8.03 K/UL — SIGNIFICANT CHANGE UP (ref 3.8–10.5)

## 2024-01-27 PROCEDURE — 99222 1ST HOSP IP/OBS MODERATE 55: CPT

## 2024-01-27 RX ORDER — OXYCODONE HYDROCHLORIDE 5 MG/1
1 TABLET ORAL
Qty: 15 | Refills: 0
Start: 2024-01-27 | End: 2024-01-29

## 2024-01-27 RX ORDER — OXYCODONE HYDROCHLORIDE 5 MG/1
1 TABLET ORAL
Refills: 0 | DISCHARGE

## 2024-01-27 RX ORDER — FENTANYL CITRATE 50 UG/ML
1 INJECTION INTRAVENOUS
Refills: 0 | Status: COMPLETED | OUTPATIENT
Start: 2024-01-27 | End: 2024-02-03

## 2024-01-27 RX ORDER — ACETAMINOPHEN 500 MG
2 TABLET ORAL
Qty: 0 | Refills: 0 | DISCHARGE
Start: 2024-01-27 | End: 2024-02-10

## 2024-01-27 RX ORDER — CEFAZOLIN SODIUM 1 G
2000 VIAL (EA) INJECTION EVERY 8 HOURS
Refills: 0 | Status: DISCONTINUED | OUTPATIENT
Start: 2024-01-27 | End: 2024-02-09

## 2024-01-27 RX ADMIN — Medication 200 MILLIGRAM(S): at 14:41

## 2024-01-27 RX ADMIN — OXYCODONE HYDROCHLORIDE 10 MILLIGRAM(S): 5 TABLET ORAL at 01:09

## 2024-01-27 RX ADMIN — Medication 650 MILLIGRAM(S): at 14:42

## 2024-01-27 RX ADMIN — PRIMIDONE 250 MILLIGRAM(S): 250 TABLET ORAL at 00:00

## 2024-01-27 RX ADMIN — Medication 500000 UNIT(S): at 12:20

## 2024-01-27 RX ADMIN — LORATADINE 10 MILLIGRAM(S): 10 TABLET ORAL at 12:20

## 2024-01-27 RX ADMIN — Medication 100 MILLIGRAM(S): at 00:48

## 2024-01-27 RX ADMIN — Medication 650 MILLIGRAM(S): at 23:50

## 2024-01-27 RX ADMIN — Medication 650 MILLIGRAM(S): at 23:44

## 2024-01-27 RX ADMIN — Medication 200 MILLIGRAM(S): at 06:02

## 2024-01-27 RX ADMIN — Medication 88 MICROGRAM(S): at 06:01

## 2024-01-27 RX ADMIN — LIOTHYRONINE SODIUM 25 MICROGRAM(S): 25 TABLET ORAL at 07:00

## 2024-01-27 RX ADMIN — FENTANYL CITRATE 1 PATCH: 50 INJECTION INTRAVENOUS at 14:11

## 2024-01-27 RX ADMIN — Medication 200 MILLIGRAM(S): at 00:04

## 2024-01-27 RX ADMIN — ENOXAPARIN SODIUM 60 MILLIGRAM(S): 100 INJECTION SUBCUTANEOUS at 10:24

## 2024-01-27 RX ADMIN — Medication 200 MILLIGRAM(S): at 21:07

## 2024-01-27 RX ADMIN — Medication 600 MILLIGRAM(S): at 00:40

## 2024-01-27 RX ADMIN — Medication 100 MILLIGRAM(S): at 14:42

## 2024-01-27 RX ADMIN — Medication 650 MILLIGRAM(S): at 15:14

## 2024-01-27 RX ADMIN — FENTANYL CITRATE 1 PATCH: 50 INJECTION INTRAVENOUS at 19:49

## 2024-01-27 RX ADMIN — SODIUM CHLORIDE 75 MILLILITER(S): 9 INJECTION, SOLUTION INTRAVENOUS at 00:48

## 2024-01-27 RX ADMIN — Medication 100 MILLIGRAM(S): at 08:15

## 2024-01-27 RX ADMIN — Medication 650 MILLIGRAM(S): at 06:40

## 2024-01-27 RX ADMIN — Medication 650 MILLIGRAM(S): at 06:02

## 2024-01-27 RX ADMIN — OXYCODONE HYDROCHLORIDE 10 MILLIGRAM(S): 5 TABLET ORAL at 02:00

## 2024-01-27 RX ADMIN — Medication 240 MILLIGRAM(S): at 00:03

## 2024-01-27 RX ADMIN — PRIMIDONE 250 MILLIGRAM(S): 250 TABLET ORAL at 18:57

## 2024-01-27 RX ADMIN — PANTOPRAZOLE SODIUM 40 MILLIGRAM(S): 20 TABLET, DELAYED RELEASE ORAL at 06:02

## 2024-01-27 RX ADMIN — PRIMIDONE 250 MILLIGRAM(S): 250 TABLET ORAL at 10:23

## 2024-01-27 RX ADMIN — Medication 100 MILLIGRAM(S): at 21:07

## 2024-01-27 RX ADMIN — SODIUM CHLORIDE 100 MILLILITER(S): 9 INJECTION, SOLUTION INTRAVENOUS at 04:47

## 2024-01-27 NOTE — PHYSICAL THERAPY INITIAL EVALUATION ADULT - PERTINENT HX OF CURRENT PROBLEM, REHAB EVAL
Pt states she fell in June/July 2023 and was attending outpatient PT for right shoulder, but didn't help.  Pt is s/p right humerus non-union ORIF.  Pt is right hand dominant.  PMH: right humeral shaft fx, lactose intolerance, gluten allergy, IBS, Lyme disease, Sjogrens syndrome, hypothyroidism, osteoporosis, chronic LBP, GERD, hearing loss, h/o hypercoagulable state, h/o antiphospholipid syndrome, acquired absence of right kidney, kidney cancer, TIA due to embolism.  PSH: tonsillectomy, appendectomy, hysterectomy, deQuervain's release surgery, microdiscectomy, nephrectomy.

## 2024-01-27 NOTE — PHYSICAL THERAPY INITIAL EVALUATION ADULT - ADDITIONAL COMMENTS
Pt is a 78 year old female, lives with  in house with 4 DREA+2HR to enter, no steps inside house.  Prior to current, pt ambulated with rollator and performed ADLs independently.  Pt is right hand dominant, states has been adjusting to using left UE since July 2023. Pt is a 78 year old female, lives with  in house with 4 DREA+2HR to enter, no steps inside house.  Prior to current, pt ambulated with rollator and performed ADLs independently.  Pt is right hand dominant, states has been adjusting to using left UE since July 2023.  Tried amb with hemiwalker, with VC's for proper sequencing and improved balance, but pt does not like it due to it feeling heavy for her.  Pt's daughter and  present during trial with hemiwalker, stated they will decide on their own if will purchase to use.  CHADD Molina, notified, of PT recommendation of hemiwalker and informed her family will decide whether to use it or not.  Informed pt and family that rollator cannot be used because of right UE NWB status.

## 2024-01-27 NOTE — CONSULT NOTE ADULT - ASSESSMENT
Aftercare s/p R ORIF humerus  - pain control as per ortho team  - IV fluid and postop antibiotics as per ortho team  - PT/OT consult  - VTE prophylaxis as per ortho Aftercare s/p R ORIF humerus  - pain control as per ortho team  - IV fluid and postop antibiotics as per ortho team  - PT/OT consult  - VTE prophylaxis as per ortho    Chronic back pain  -holding home fentanyl patch  -oxycodone PRN, dilaudid q3h PRN breakthrough pain    Hypothyroidism  -continue home levothyroxine 88mcg, liothyronine    GERD  continue pantoprazole Aftercare s/p R ORIF humerus  - pain control as per ortho team  - IV fluid and postop antibiotics as per ortho team  - PT/OT consult  - VTE prophylaxis as per ortho    Chronic back pain  -holding home fentanyl patch  -oxycodone PRN, dilaudid q3h PRN breakthrough pain    Hypothyroidism  -continue home levothyroxine 88mcg, liothyronine    GERD  -continue pantoprazole

## 2024-01-27 NOTE — CARE COORDINATION ASSESSMENT. - NSPASTMEDSURGHISTORY_GEN_ALL_CORE_FT
PAST MEDICAL & SURGICAL HISTORY:  Fracture of shaft of right humerus      Lactose intolerance      Allergy to gluten      IBS (irritable bowel syndrome)      History of Lyme disease      Sjogrens syndrome      Uses roller walker      Hypothyroidism      Opioid use      Osteoporosis      Chronic lower back pain      GERD (gastroesophageal reflux disease)      Hearing loss      History of hypercoagulable state      History of antiphospholipid syndrome      Acquired absence of right kidney      Cancer of kidney      TIA due to embolism      History of tonsillectomy      History of appendectomy      History of hysterectomy      Status post de Quervain's release surgery      History of open reduction and internal fixation (ORIF) procedure      History of microdiscectomy      History of nephrectomy

## 2024-01-27 NOTE — CARE COORDINATION ASSESSMENT. - NSDCPLANSERVICES_GEN_ALL_CORE
This CM met at the bedside with the pt and the  and the daughter. Introduced myself as the CM explained my role and left contact information. The pt verbalized understanding. The pt gave this CM permission to speak to her  and daughter who is a nurse about her plan of care for transition home. The pt lives in a private home with her  who she states is her caregiver. The pt was using a rollator prior to this admission. The pt has 3 steps to enter the home then resides on the main floor of [place] home. The pt has a commode, shower chair, cane, and transport WC at home. PT/OT are recommending out-patient. The daughter asked if this CM can find out if the pt would be accepted for [facility] services. I explained that the pt does not have a qualifying diagnosis at the pt is s/p ORIF of the right humerus. This CM spoke to the in-take nurse at [facility] who stated that they will not be accepting this case. The pt and family were made aware and thankful for trying. This CM gave the  the script for out-patient PT/OT services. CM provided with a list of PT places that do come into the home. PT is recommending a jeannine-cane/walker. This CM will send the script to [facility] for delivery to the home on Sunday or Monday. The pt is also safe for hand held assist with the  as per the therapists. Pt for DC home tomorrow. CM to follow up tomorrow for DME answer from [facility]. The  will transport the pt home tomorrow. The PCP is Dr. Oliverio Guzman and the pharmacy is [facility] in [place]./Outpatient Center/Clinic

## 2024-01-27 NOTE — DISCHARGE NOTE PROVIDER - NSDCCPCAREPLAN_GEN_ALL_CORE_FT
PRINCIPAL DISCHARGE DIAGNOSIS  Diagnosis: Fracture of humerus with nonunion  Assessment and Plan of Treatment: sling right upper extremity  nonweightbearing RUE  ice 20 minutes on alternating with 20 minutes off for 48 hours post op

## 2024-01-27 NOTE — DISCHARGE NOTE NURSING/CASE MANAGEMENT/SOCIAL WORK - NSDCPEFALRISK_GEN_ALL_CORE
For information on Fall & Injury Prevention, visit: [email or web address] OR  [email or web address] OR  https://www.cdc.gov/steadi/patient.html

## 2024-01-27 NOTE — DISCHARGE NOTE PROVIDER - CARE PROVIDER_API CALL
Keith Martinez  Orthopaedic Surgery  [address], [facility] [address]   [address]  Phone: [phone]  Fax: [phone]  Established Patient  Follow Up Time: 2 weeks

## 2024-01-27 NOTE — CONSULT NOTE ADULT - SUBJECTIVE AND OBJECTIVE BOX
Patient is a [age] yo M/F with PMH of  who presents for orthopedic surgery. Patient underwent R ORIF humrus on 1/27, procedure was unremarkable as per postop note. Patient reports working with PT/OT today, no loss of sensation in extremities.          REVIEW OF SYSTEMS:  CONSTITUTIONAL: No fever, weight loss, or fatigue  EYES: No eye pain, visual disturbances, or discharge  ENMT:  No difficulty hearing, tinnitus, vertigo; No sinus or throat pain  NECK: No pain or stiffness  RESPIRATORY: No cough, wheezing, chills or hemoptysis; No shortness of breath  CARDIOVASCULAR: No chest pain, palpitations, dizziness, or leg swelling  GASTROINTESTINAL: No abdominal or epigastric pain. No nausea, vomiting, or hematemesis; No diarrhea or constipation. No melena or hematochezia.  GENITOURINARY: No dysuria, frequency, hematuria, or incontinence  NEUROLOGICAL: No headaches, memory loss, loss of strength, numbness, or tremors  SKIN: No itching, burning, rashes, or lesions   LYMPH NODES: No enlarged glands  ENDOCRINE: No heat or cold intolerance; No hair loss; No polydipsia or polyuria  MUSCULOSKELETAL: No joint pain or swelling; No muscle, back, or extremity pain  HEME/LYMPH: No easy bruising, or bleeding gums  ALLERGY AND IMMUNOLOGIC: No hives or eczema      GENERAL: patient appears well, no acute distress, appropriate behavior  EYES: sclera clear, no exudates, PERRLA  ENMT: moist mucous membranes, oropharynx clear without erythema, no exudates  LUNGS:  no increased work of breathing, no wheezing appreciated  HEART: no lower extremity edema appreciated  GASTROINTESTINAL: abdomen is soft, nontender, nondistended, no palpable masses appreciated  INTEGUMENT: good skin turgor, warm, dry and intact, no lesions appreciated  MUSCULOSKELETAL: no clubbing or cyanosis, no obvious deformity  NEUROLOGIC: awake, alert, oriented x3, strength no obvious sensory deficits  PSYCHIATRIC: mood is good, affect is congruent, linear and logical thought process  HEME/LYMPH:  no obvious ecchymosis or petechiae     Patient is a 77 yo F with PMH of chronic back pain, prior vertebral fractures with pain management outpatient care, kidney cancer, hypothyroidism, IBS, TIA from embolism, Sjogrens syndrome, GERD, hearing loss who presents for R humerus fracture. Patient underwent R ORIF humrus on 1/26, procedure was unremarkable as per postop note. Patient reports working with PT/OT today, no loss of sensation in extremities.          REVIEW OF SYSTEMS:  CONSTITUTIONAL: No fever, weight loss, or fatigue  EYES: No eye pain, visual disturbances, or discharge  ENMT:  No difficulty hearing, tinnitus, vertigo; No sinus or throat pain  NECK: No pain or stiffness  RESPIRATORY: No cough, wheezing, chills or hemoptysis; No shortness of breath  CARDIOVASCULAR: No chest pain, palpitations, dizziness, or leg swelling  GASTROINTESTINAL: No abdominal or epigastric pain. No nausea, vomiting, or hematemesis; No diarrhea or constipation. No melena or hematochezia.  GENITOURINARY: No dysuria, frequency, hematuria, or incontinence  NEUROLOGICAL: No headaches, memory loss, loss of strength, numbness, or tremors  SKIN: No itching, burning, rashes, or lesions   MUSCULOSKELETAL: No joint pain or swelling; No muscle, back, or extremity pain        GENERAL: patient appears well, no acute distress, appropriate behavior  EYES: sclera clear, no exudates, PERRLA  ENMT: moist mucous membranes, oropharynx clear without erythema, no exudates  LUNGS:  no increased work of breathing, no wheezing appreciated  HEART: no lower extremity edema appreciated  GASTROINTESTINAL: abdomen is soft, nontender, nondistended, no palpable masses appreciated  INTEGUMENT: good skin turgor, warm, dry and intact, no lesions appreciated  MUSCULOSKELETAL: R arm in sling, no clubbing or cyanosis, no obvious deformity, moves fingers bilaterally  NEUROLOGIC: awake, alert, oriented x3, strength no obvious sensory deficits, intact sensation in hands b/l  PSYCHIATRIC: mood is good, affect is congruent, linear and logical thought process  HEME/LYMPH:  no obvious ecchymosis or petechiae

## 2024-01-27 NOTE — PROGRESS NOTE ADULT - SUBJECTIVE AND OBJECTIVE BOX
POST OPERATIVE DAY #: 1  STATUS POST: ORIF nonunion Right humerus fx    SUBJECTIVE: Patient seen and examined.     Reported Pain Score (0-10):     OBJECTIVE:     Vital Signs Last 24 Hrs  T(C): 36.3 (27 Jan 2024 08:16), Max: 36.4 (26 Jan 2024 11:48)  T(F): 97.3 (27 Jan 2024 08:16), Max: 97.6 (26 Jan 2024 21:30)  HR: 90 (27 Jan 2024 08:16) (60 - 98)  BP: 92/57 (27 Jan 2024 08:16) (91/59 - 127/66)  RR: 16 (27 Jan 2024 08:16) (12 - 22)  SpO2: 97% (27 Jan 2024 08:16) (97% - 100%)    Parameters below as of 27 Jan 2024 08:16  Patient On (Oxygen Delivery Method): room air        Right upper extremity:          sling in place         Dressing:  clean/dry/intact            Sensation:  intact to light touch          Motor exam: 5/5          warm well perfused; capillary refill <3 seconds     LABS:                        11.6   8.03  )-----------( 225      ( 27 Jan 2024 06:30 )             35.3     01-27    136  |  102  |  21  ----------------------------<  105<H>  4.8   |  26  |  0.72    Ca    8.5      27 Jan 2024 06:30        MEDICATIONS:  Anticoagulation:  enoxaparin Injectable 60 milliGRAM(s) SubCutaneous every 24 hours      Pain medications:   acetaminophen     Tablet .. 650 milliGRAM(s) Oral every 8 hours  HYDROmorphone  Injectable 0.5 milliGRAM(s) IV Push every 3 hours PRN  oxyCODONE    IR 5 milliGRAM(s) Oral every 3 hours PRN  oxyCODONE    IR 10 milliGRAM(s) Oral every 3 hours PRN          A/P :   s/p ORIF nonunion Right humerus fx  POD # 1  -    Pain control  -    DVT ppx: Lovenox 60mg SQ daily  -    Physical Therapy, Occupational therapy NWB RUE  -    Discharge planning: POST OPERATIVE DAY #: 1  STATUS POST: ORIF nonunion Right humerus fx    SUBJECTIVE: Patient seen and examined. Feeling well.     Reported Pain Score (0-10): 4    OBJECTIVE:     Vital Signs Last 24 Hrs  T(C): 36.3 (27 Jan 2024 08:16), Max: 36.4 (26 Jan 2024 11:48)  T(F): 97.3 (27 Jan 2024 08:16), Max: 97.6 (26 Jan 2024 21:30)  HR: 90 (27 Jan 2024 08:16) (60 - 98)  BP: 92/57 (27 Jan 2024 08:16) (91/59 - 127/66)  RR: 16 (27 Jan 2024 08:16) (12 - 22)  SpO2: 97% (27 Jan 2024 08:16) (97% - 100%)    Parameters below as of 27 Jan 2024 08:16  Patient On (Oxygen Delivery Method): room air        Right upper extremity:          sling in place         Dressing:  clean/dry/intact            Sensation:  intact to light touch          Motor exam: 5/5 wrist flexion, extension, finger ROM, thumb abduction & extension         2+ radial pulse          warm well perfused; capillary refill <3 seconds          calves supple & NT    LABS:                        11.6   8.03  )-----------( 225      ( 27 Jan 2024 06:30 )             35.3     01-27    136  |  102  |  21  ----------------------------<  105<H>  4.8   |  26  |  0.72    Ca    8.5      27 Jan 2024 06:30        MEDICATIONS:  Anticoagulation:  enoxaparin Injectable 60 milliGRAM(s) SubCutaneous every 24 hours      Pain medications:   acetaminophen     Tablet .. 650 milliGRAM(s) Oral every 8 hours  HYDROmorphone  Injectable 0.5 milliGRAM(s) IV Push every 3 hours PRN  oxyCODONE    IR 5 milliGRAM(s) Oral every 3 hours PRN  oxyCODONE    IR 10 milliGRAM(s) Oral every 3 hours PRN  Duragesic 12mcg patch q 72h          A/P :  Patient stable s/p ORIF nonunion Right humerus fx  POD # 1  -    Pain control  -    DVT ppx: Lovenox 60mg SQ daily  -    Physical Therapy, Occupational therapy NWB RUE  -    Discharge planning: home today vs tomorrow POST OPERATIVE DAY #: 1  STATUS POST: ORIF nonunion Right humerus fx    SUBJECTIVE: Patient seen and examined. Feeling well. Concerned about going home without assistance as she regularly ambulates with a walker and is now unable to WB on RUE. Gait is unsteady.    Reported Pain Score (0-10): 4    OBJECTIVE:     Vital Signs Last 24 Hrs  T(C): 36.3 (27 Jan 2024 08:16), Max: 36.4 (26 Jan 2024 11:48)  T(F): 97.3 (27 Jan 2024 08:16), Max: 97.6 (26 Jan 2024 21:30)  HR: 90 (27 Jan 2024 08:16) (60 - 98)  BP: 92/57 (27 Jan 2024 08:16) (91/59 - 127/66)  RR: 16 (27 Jan 2024 08:16) (12 - 22)  SpO2: 97% (27 Jan 2024 08:16) (97% - 100%)    Parameters below as of 27 Jan 2024 08:16  Patient On (Oxygen Delivery Method): room air        Right upper extremity:          sling in place         Dressing:  clean/dry/intact            Sensation:  intact to light touch          Motor exam: 5/5 wrist flexion, extension, finger ROM, thumb abduction & extension         2+ radial pulse          warm well perfused; capillary refill <3 seconds          calves supple & NT    LABS:                        11.6   8.03  )-----------( 225      ( 27 Jan 2024 06:30 )             35.3     01-27    136  |  102  |  21  ----------------------------<  105<H>  4.8   |  26  |  0.72    Ca    8.5      27 Jan 2024 06:30        MEDICATIONS:  Anticoagulation:  enoxaparin Injectable 60 milliGRAM(s) SubCutaneous every 24 hours      Pain medications:   acetaminophen     Tablet .. 650 milliGRAM(s) Oral every 8 hours  HYDROmorphone  Injectable 0.5 milliGRAM(s) IV Push every 3 hours PRN  oxyCODONE    IR 5 milliGRAM(s) Oral every 3 hours PRN  oxyCODONE    IR 10 milliGRAM(s) Oral every 3 hours PRN  Duragesic 12mcg patch q 72h          A/P :  Patient stable s/p ORIF nonunion Right humerus fx  POD # 1  -    Pain control  -    DVT ppx: Lovenox 60mg SQ daily  -    Physical Therapy, Occupational therapy NWLISA RODRIGUEZ  -    Discharge planning: home today vs tomorrow with home OT, PT for ADLs, ambulation

## 2024-01-27 NOTE — PHYSICAL THERAPY INITIAL EVALUATION ADULT - ACTIVE RANGE OF MOTION EXAMINATION, REHAB EVAL
right wrist and fingers AROM WFL/Left LE Active ROM was WFL (within functional limits)/bilateral  lower extremity Active ROM was WFL (within functional limits)

## 2024-01-27 NOTE — DISCHARGE NOTE NURSING/CASE MANAGEMENT/SOCIAL WORK - NSDCCRNAME_GEN_ALL_CORE_FT
You have been recommended for home PT services.  The MD will provide a prescription for home PT, call one of the vendors listed ( or one of your choosing in your area) with your insurance information.  They will also need the original prescription.   Rehab at home [phone]  [facility] [phone]  [facility] [phone]  [facility] [phone]  [facility] [phone]

## 2024-01-27 NOTE — PHYSICAL THERAPY INITIAL EVALUATION ADULT - GENERAL OBSERVATIONS, REHAB EVAL
Pt received in bedside chair, right UE in ACE bandage and sling, +telemetry, +IV.  +Eyeglasses and hearing aid.

## 2024-01-27 NOTE — CARE COORDINATION ASSESSMENT. - NSCAREPROVIDERS_GEN_ALL_CORE_FT
CARE PROVIDERS:  Administration: Alfredo Martinez  Administration: Marbella Duvall  Administration: Waldemar Valle  Administration: Shaista Maya  Admitting: Keith Martinez  Attending: Keith Martinez  Case Management: Tracy Chatman  Consultant: Wil Wheeler  Nurse: Carlee Weiss  Nurse: Monica Neri  Occupational Therapy: Melissa Busch  Override: Lisa Sylvester  Override: Adrianne Contreras  Override: Tatyana Blood  PCA/Nursing Assistant: Nick Portillo  Physical Therapy: Mary Ellen Pollack  Physical Therapy: Dorothy Hale  Primary Team: Tracy Graves  Primary Team: Cm Costa  Primary Team: Cecille Rey  Primary Team: Franck Henderson  Primary Team: Lily Amado  Primary Team: Alex Loya  Registered Dietitian: Mónica Baltazar  Respiratory Therapy: Brian Beckman  Respiratory Therapy: Elana Huddleston  : Chantelle Yeboah// Supp. Assoc.: Umu Marx

## 2024-01-27 NOTE — CASE MANAGEMENT PROGRESS NOTE - NSCMPROGRESSNOTE_GEN_ALL_CORE
Script was sent to [facility] for a youth jeannine-walker. Request to deliver to the home address as the pt is being DC home tomorrow.

## 2024-01-27 NOTE — DISCHARGE NOTE PROVIDER - NSDCMRMEDTOKEN_GEN_ALL_CORE_FT
Clarinex 5 mg oral tablet: 1 tab(s) orally once a day  Cytomel 25 mcg oral tablet: 1 tab(s) orally once a day  Duragesic-12 transdermal film, extended release: 1 patch transdermally every 3 days  Lovenox 100 mg/mL injectable solution: 100 milligram(s) subcutaneously once a day unsure of dosage  Lyvispah 10 mg oral granule: 1 each orally once a day  Lyvispah 10 mg oral granule: 1 each orally once a day  magnesium 550 mg QD:   Miacalcin Nasal 200 intl units/inh nasal spray: 1 spray(s) intranasally once a day  Mysoline 250 mg oral tablet: 1 tab(s) orally 2 times a day  nystatin 500,000 units oral tablet: 1 tab(s) orally once a day  oxyCODONE 5 mg oral tablet: 1 tab(s) orally 4 times a day  Pepcid 40 mg oral tablet: 1 tab(s) orally once a day  Potassium Chloride (Eqv-Klor-Con 10) 10 mEq oral tablet, extended release: 1 tab(s) orally 3 times a week  Salagen 5 mg oral tablet: 1 tab(s) orally 2 times a day  Strontium: IM injection twice a week; Sundays and Wednesday  Tegretol 200 mg oral tablet: 1 tab(s) orally 3 times a day  Tirosint 88 mcg (0.088 mg) oral capsule: 1 cap(s) orally once a day  Xifaxan 550 mg oral tablet: 1 tab(s) orally 2 times a day   acetaminophen 325 mg oral tablet: 2 tab(s) orally every 8 hours  Clarinex 5 mg oral tablet: 1 tab(s) orally once a day  Cytomel 25 mcg oral tablet: 1 tab(s) orally once a day  Duragesic-12 transdermal film, extended release: 1 patch transdermally every 3 days  Lovenox 100 mg/mL injectable solution: 100 milligram(s) subcutaneously once a day unsure of dosage  Lyvispah 10 mg oral granule: 1 each orally once a day  Lyvispah 10 mg oral granule: 1 each orally once a day  magnesium 550 mg QD:   Miacalcin Nasal 200 intl units/inh nasal spray: 1 spray(s) intranasally once a day  Mysoline 250 mg oral tablet: 1 tab(s) orally 2 times a day  nystatin 500,000 units oral tablet: 1 tab(s) orally once a day  oxyCODONE 5 mg oral tablet: 1 tab(s) orally every 4 hours as needed for  severe pain  ref#[id number] MDD: 6  Pepcid 40 mg oral tablet: 1 tab(s) orally once a day  Potassium Chloride (Eqv-Klor-Con 10) 10 mEq oral tablet, extended release: 1 tab(s) orally 3 times a week  Salagen 5 mg oral tablet: 1 tab(s) orally 2 times a day  Strontium: IM injection twice a week; Sundays and Wednesday  Tegretol 200 mg oral tablet: 1 tab(s) orally 3 times a day  Tirosint 88 mcg (0.088 mg) oral capsule: 1 cap(s) orally once a day  Xifaxan 550 mg oral tablet: 1 tab(s) orally 2 times a day   acetaminophen 325 mg oral tablet: 2 tab(s) orally every 8 hours  cefadroxil 500 mg oral capsule: 1 cap(s) orally 2 times a day  Clarinex 5 mg oral tablet: 1 tab(s) orally once a day  Cytomel 25 mcg oral tablet: 1 tab(s) orally once a day  Duragesic-12 transdermal film, extended release: 1 patch transdermally every 3 days  Lovenox 100 mg/mL injectable solution: 100 milligram(s) subcutaneously once a day unsure of dosage  Lyvispah 10 mg oral granule: 1 each orally once a day  Lyvispah 10 mg oral granule: 1 each orally once a day  magnesium 550 mg QD:   Miacalcin Nasal 200 intl units/inh nasal spray: 1 spray(s) intranasally once a day  Mysoline 250 mg oral tablet: 1 tab(s) orally 2 times a day  nystatin 500,000 units oral tablet: 1 tab(s) orally once a day  oxyCODONE 5 mg oral tablet: 1 tab(s) orally every 4 hours as needed for  severe pain [facility] ref#[id number] MDD: 6  Pepcid 40 mg oral tablet: 1 tab(s) orally once a day  Potassium Chloride (Eqv-Klor-Con 10) 10 mEq oral tablet, extended release: 1 tab(s) orally 3 times a week  Salagen 5 mg oral tablet: 1 tab(s) orally 2 times a day  Strontium: IM injection twice a week; Sundays and Wednesday  Tegretol 200 mg oral tablet: 1 tab(s) orally 3 times a day  Tirosint 88 mcg (0.088 mg) oral capsule: 1 cap(s) orally once a day  Xifaxan 550 mg oral tablet: 1 tab(s) orally 2 times a day

## 2024-01-27 NOTE — DISCHARGE NOTE NURSING/CASE MANAGEMENT/SOCIAL WORK - NSDCDMETYPESERV_GEN_ALL_CORE_FT
For your Anselmo-walker to be delivered to the home. Please call to follow up on Monday if you do not here from the company by then.

## 2024-01-27 NOTE — DISCHARGE NOTE NURSING/CASE MANAGEMENT/SOCIAL WORK - PATIENT PORTAL LINK FT
You can access the FollowMyHealth Patient Portal offered by [facility] by registering at the following website: [email or web address]. By joining [facility]’s FollowMyHealth portal, you will also be able to view your health information using other applications (apps) compatible with our system.

## 2024-01-27 NOTE — PHYSICAL THERAPY INITIAL EVALUATION ADULT - PHYSICAL ASSIST/NONPHYSICAL ASSIST: STAND/SIT, REHAB EVAL
verbal cues/1 person assist VC's for proper left hand placement for safety/verbal cues/1 person assist

## 2024-01-27 NOTE — OCCUPATIONAL THERAPY INITIAL EVALUATION ADULT - ASR WT BEARING STATUS EVAL
Render Post-Care Instructions In Note?: no Anesthesia Volume In Cc: 0.5 Detail Level: Simple Treatment Number (Will Not Render If 0): 0 Consent: The patient's consent was obtained including but not limited to risks of crusting, scabbing, blistering, scarring, darker or lighter pigmentary change, recurrence, incomplete removal and infection. Post-Care Instructions: I reviewed with the patient in detail post-care instructions. Patient is to wear sunprotection, and avoid picking at any of the treated lesions. Pt may apply Vaseline to crusted or scabbing areas. Medical Necessity Information: It is in your best interest to select a reason for this procedure from the list below. All of these items fulfill various CMS LCD requirements except the new and changing color options. Medical Necessity Clause: This procedure was medically necessary because the lesions that were treated were: Right UE

## 2024-01-27 NOTE — OCCUPATIONAL THERAPY INITIAL EVALUATION ADULT - ADDITIONAL COMMENTS
s/p fall in 2023 with fracture not healing.  has been assisting with ADL.  4 steps to enter with handrail. No stairs inside. Walk in shower,  Was using a rollator prior

## 2024-01-27 NOTE — DISCHARGE NOTE PROVIDER - NSDCCPTREATMENT_GEN_ALL_CORE_FT
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

## 2024-01-27 NOTE — DISCHARGE NOTE PROVIDER - HOSPITAL COURSE
This patient was admitted to [facility] with a history of nonunion right humerus fx.  Patient underwent Pre-Surgical Testing and was medically cleared to undergo elective procedure. Patient underwent ORIF nonunion Right humerus fx by Dr. Keith Martinez on 1/26/24. Patient received 2 units PRBCs intraoperatively. Procedure was well tolerated.  No operative or loree-operative complications arose during patient's hospital course.  Patient received antibiotic according to SCIP guidelines for infection prevention.  Lovenox was given for DVT prophylaxis, in addition to the use of SCDs.  Anesthesia, Medical Hospitalist, Physical Therapy and Occupational Therapy were consulted. Patient is stable for discharge with a good prognosis.  Appropriate discharge instructions and medications are provided in this document.  Patient is going home with home care (PT/OT/Skilled Nursing).

## 2024-01-27 NOTE — PHYSICAL THERAPY INITIAL EVALUATION ADULT - MANUAL MUSCLE TESTING RESULTS, REHAB EVAL
right UE not tested due to surgery.  Bilateral knee extension 5/5, flexion 3+/5, hip flexion 3+/5/not tested due to

## 2024-01-28 VITALS
TEMPERATURE: 98 F | HEART RATE: 77 BPM | RESPIRATION RATE: 18 BRPM | DIASTOLIC BLOOD PRESSURE: 76 MMHG | SYSTOLIC BLOOD PRESSURE: 156 MMHG | OXYGEN SATURATION: 97 %

## 2024-01-28 LAB
ANION GAP SERPL CALC-SCNC: 10 MMOL/L — SIGNIFICANT CHANGE UP (ref 5–17)
BUN SERPL-MCNC: 18 MG/DL — SIGNIFICANT CHANGE UP (ref 7–23)
CALCIUM SERPL-MCNC: 8.8 MG/DL — SIGNIFICANT CHANGE UP (ref 8.4–10.5)
CHLORIDE SERPL-SCNC: 99 MMOL/L — SIGNIFICANT CHANGE UP (ref 96–108)
CO2 SERPL-SCNC: 25 MMOL/L — SIGNIFICANT CHANGE UP (ref 22–31)
CREAT SERPL-MCNC: 0.74 MG/DL — SIGNIFICANT CHANGE UP (ref 0.5–1.3)
EGFR: 83 ML/MIN/1.73M2 — SIGNIFICANT CHANGE UP
GLUCOSE SERPL-MCNC: 99 MG/DL — SIGNIFICANT CHANGE UP (ref 70–99)
POTASSIUM SERPL-MCNC: 3.8 MMOL/L — SIGNIFICANT CHANGE UP (ref 3.5–5.3)
POTASSIUM SERPL-SCNC: 3.8 MMOL/L — SIGNIFICANT CHANGE UP (ref 3.5–5.3)
SODIUM SERPL-SCNC: 134 MMOL/L — LOW (ref 135–145)

## 2024-01-28 PROCEDURE — 85025 COMPLETE CBC W/AUTO DIFF WBC: CPT

## 2024-01-28 PROCEDURE — 97116 GAIT TRAINING THERAPY: CPT

## 2024-01-28 PROCEDURE — 76000 FLUOROSCOPY <1 HR PHYS/QHP: CPT

## 2024-01-28 PROCEDURE — 85027 COMPLETE CBC AUTOMATED: CPT

## 2024-01-28 PROCEDURE — 80048 BASIC METABOLIC PNL TOTAL CA: CPT

## 2024-01-28 PROCEDURE — 88305 TISSUE EXAM BY PATHOLOGIST: CPT

## 2024-01-28 PROCEDURE — 97530 THERAPEUTIC ACTIVITIES: CPT

## 2024-01-28 PROCEDURE — 24435 RPR NON/MAL HUM WITH AGRFT: CPT | Mod: RT

## 2024-01-28 PROCEDURE — C1713: CPT

## 2024-01-28 PROCEDURE — 73060 X-RAY EXAM OF HUMERUS: CPT

## 2024-01-28 PROCEDURE — 97165 OT EVAL LOW COMPLEX 30 MIN: CPT

## 2024-01-28 PROCEDURE — 36415 COLL VENOUS BLD VENIPUNCTURE: CPT

## 2024-01-28 PROCEDURE — 97161 PT EVAL LOW COMPLEX 20 MIN: CPT

## 2024-01-28 PROCEDURE — C1889: CPT

## 2024-01-28 PROCEDURE — 36430 TRANSFUSION BLD/BLD COMPNT: CPT

## 2024-01-28 PROCEDURE — P9016: CPT

## 2024-01-28 RX ADMIN — LIOTHYRONINE SODIUM 25 MICROGRAM(S): 25 TABLET ORAL at 06:26

## 2024-01-28 RX ADMIN — ENOXAPARIN SODIUM 60 MILLIGRAM(S): 100 INJECTION SUBCUTANEOUS at 09:06

## 2024-01-28 RX ADMIN — SODIUM CHLORIDE 100 MILLILITER(S): 9 INJECTION, SOLUTION INTRAVENOUS at 04:33

## 2024-01-28 RX ADMIN — FENTANYL CITRATE 1 PATCH: 50 INJECTION INTRAVENOUS at 07:21

## 2024-01-28 RX ADMIN — Medication 650 MILLIGRAM(S): at 06:29

## 2024-01-28 RX ADMIN — OXYCODONE HYDROCHLORIDE 5 MILLIGRAM(S): 5 TABLET ORAL at 03:13

## 2024-01-28 RX ADMIN — OXYCODONE HYDROCHLORIDE 5 MILLIGRAM(S): 5 TABLET ORAL at 04:00

## 2024-01-28 RX ADMIN — Medication 100 MILLIGRAM(S): at 05:37

## 2024-01-28 RX ADMIN — PRIMIDONE 250 MILLIGRAM(S): 250 TABLET ORAL at 06:26

## 2024-01-28 RX ADMIN — Medication 200 MILLIGRAM(S): at 06:26

## 2024-01-28 RX ADMIN — Medication 88 MICROGRAM(S): at 05:37

## 2024-01-28 RX ADMIN — Medication 650 MILLIGRAM(S): at 06:26

## 2024-01-28 RX ADMIN — PANTOPRAZOLE SODIUM 40 MILLIGRAM(S): 20 TABLET, DELAYED RELEASE ORAL at 06:26

## 2024-01-28 NOTE — PROGRESS NOTE ADULT - SUBJECTIVE AND OBJECTIVE BOX
ORTHOPEDIC PA PROGRESS NOTE  AUDREY GUTIERREZ      78y Female                                                                                                                               POD # 2    STATUS POST:               Pre-Op Dx: Fracture of humerus with nonunion      Post-Op Dx:  Fracture of humerus with nonunion      Procedure: Open reduction and internal fixation (ORIF) of fracture nonunion of humerus                                                  Pain (0-10): 3  Current Pain Management:  [ ] PCA   [x ] Po Analgesics [ ] IM /IV Anagesics     In PACU Neurovascular Checks every 15 min for 2 hours  Then  On Floor - Every 2 hours for 8 hours  Then  Every 4 Hours For 8 Hours  Then   Every 8 Hours                        11.6   8.03  )-----------( 225      ( 27 Jan 2024 06:30 )             35.3                     01-28    134<L>  |  99  |  18  ----------------------------<  99  3.8   |  25  |  0.74    Ca    8.8      28 Jan 2024 06:30      Physical Exam :    -   Dressing sterile.   -   Dressing C/D/I.   -   Distal Neurvascular status intact grossly.   -   Warm well perfused; capillary refill <3 seconds   -   (+) Sensation to light touch  -  fingers pink/warm/mobile, +  strength 5/5, + sensation, fingers are moderately swollen, within normal limits post surgery.  Discussed importance of continuing to wiggle fingers and use ice    A/P: 78y Female s/p Fracture of humerus with nonunion      -   Ortho Stable  -   Pain control   -   Medicine to follow  -   DVT ppx:     [x ]SCDs     [ ] ASA     [ ] Eliquis     [x ] Lovenox  -   Weight bearing status:  WBAT [ ]        PWB    [ ]     TTWB  [ ]      NWB  [x ]   -  Dispo:     Home [x ]     Acute Rehab [ ]     SOURAV [ ]     TBD [ ]  - Patient is neurovascularly in tact, will be discharged home today with jeannine-walker and 14 days of duricef as per Dr. Martinez

## 2024-01-28 NOTE — CASE MANAGEMENT PROGRESS NOTE - NSCMPROGRESSNOTE_GEN_ALL_CORE
Pt was cleared by the team for transition home today. The pt  and daughter are aware that the jeannine-walker is pending insurance auth as per [facility] and they will receive a call to set up delivery. The number for [facility] was given to the pt and family to follow up tomorrow. The pt will be taken care of by the  and daughter with hand-held assist as per PT/OT recommendation. Script was given yesterday for home PT and choices for places that come into the home were given as well.

## 2024-01-29 PROBLEM — M81.0 AGE-RELATED OSTEOPOROSIS WITHOUT CURRENT PATHOLOGICAL FRACTURE: Chronic | Status: ACTIVE | Noted: 2024-01-23

## 2024-01-29 PROBLEM — M35.00 SJOGREN SYNDROME, UNSPECIFIED: Chronic | Status: ACTIVE | Noted: 2024-01-23

## 2024-01-29 PROBLEM — Z91.018 ALLERGY TO OTHER FOODS: Chronic | Status: ACTIVE | Noted: 2024-01-23

## 2024-01-29 PROBLEM — E03.9 HYPOTHYROIDISM, UNSPECIFIED: Chronic | Status: ACTIVE | Noted: 2024-01-23

## 2024-01-29 PROBLEM — H91.90 UNSPECIFIED HEARING LOSS, UNSPECIFIED EAR: Chronic | Status: ACTIVE | Noted: 2024-01-23

## 2024-01-29 PROBLEM — F11.90 OPIOID USE, UNSPECIFIED, UNCOMPLICATED: Chronic | Status: ACTIVE | Noted: 2024-01-23

## 2024-01-29 RX ORDER — CIPROFLOXACIN HYDROCHLORIDE 500 MG/1
500 TABLET, FILM COATED ORAL
Qty: 20 | Refills: 0 | Status: ACTIVE | COMMUNITY
Start: 2024-01-29 | End: 1900-01-01

## 2024-02-08 ENCOUNTER — APPOINTMENT (OUTPATIENT)
Dept: ORTHOPEDIC SURGERY | Facility: CLINIC | Age: 79
End: 2024-02-08
Payer: MEDICARE

## 2024-02-08 VITALS — BODY MASS INDEX: 20.76 KG/M2 | WEIGHT: 103 LBS | HEIGHT: 59 IN

## 2024-02-08 PROBLEM — G45.9 TRANSIENT CEREBRAL ISCHEMIC ATTACK, UNSPECIFIED: Chronic | Status: ACTIVE | Noted: 2024-01-23

## 2024-02-08 PROBLEM — Z99.89 DEPENDENCE ON OTHER ENABLING MACHINES AND DEVICES: Chronic | Status: ACTIVE | Noted: 2024-01-23

## 2024-02-08 PROBLEM — Z86.2 PERSONAL HISTORY OF DISEASES OF THE BLOOD AND BLOOD-FORMING ORGANS AND CERTAIN DISORDERS INVOLVING THE IMMUNE MECHANISM: Chronic | Status: ACTIVE | Noted: 2024-01-23

## 2024-02-08 PROBLEM — K21.9 GASTRO-ESOPHAGEAL REFLUX DISEASE WITHOUT ESOPHAGITIS: Chronic | Status: ACTIVE | Noted: 2024-01-23

## 2024-02-08 PROBLEM — C64.9 MALIGNANT NEOPLASM OF UNSPECIFIED KIDNEY, EXCEPT RENAL PELVIS: Chronic | Status: ACTIVE | Noted: 2024-01-23

## 2024-02-08 PROBLEM — S42.301A UNSPECIFIED FRACTURE OF SHAFT OF HUMERUS, RIGHT ARM, INITIAL ENCOUNTER FOR CLOSED FRACTURE: Chronic | Status: ACTIVE | Noted: 2024-01-23

## 2024-02-08 PROBLEM — K58.9 IRRITABLE BOWEL SYNDROME WITHOUT DIARRHEA: Chronic | Status: ACTIVE | Noted: 2024-01-23

## 2024-02-08 PROBLEM — M54.50 LOW BACK PAIN, UNSPECIFIED: Chronic | Status: ACTIVE | Noted: 2024-01-23

## 2024-02-08 PROBLEM — E73.9 LACTOSE INTOLERANCE, UNSPECIFIED: Chronic | Status: ACTIVE | Noted: 2024-01-23

## 2024-02-08 PROBLEM — Z86.19 PERSONAL HISTORY OF OTHER INFECTIOUS AND PARASITIC DISEASES: Chronic | Status: ACTIVE | Noted: 2024-01-23

## 2024-02-08 PROBLEM — Z90.5 ACQUIRED ABSENCE OF KIDNEY: Chronic | Status: ACTIVE | Noted: 2024-01-23

## 2024-02-08 PROCEDURE — 99024 POSTOP FOLLOW-UP VISIT: CPT

## 2024-02-08 PROCEDURE — 73060 X-RAY EXAM OF HUMERUS: CPT

## 2024-02-08 NOTE — ADDENDUM
[FreeTextEntry1] :  I, Kristyn Santoro wrote this note acting as a scribe for Dr. Keith Martinez on Feb 08, 2024.

## 2024-02-08 NOTE — END OF VISIT
[FreeTextEntry3] :  All medical record entries made by the Scribe were at my,  Dr. Keith Martinez MD., direction and personally dictated by me on 02/08/2024. I have personally reviewed the chart and agree that the record accurately reflects my personal performance of the history, physical exam, assessment and plan.

## 2024-02-08 NOTE — HISTORY OF PRESENT ILLNESS
[de-identified] : 1st POA s/p ORIF of right humeral nonunion with anterior plate, lateral plate, and also medial femoral allograft and 30 mL cancellous chips with 10 mL of bone marrow aspirate from right iliac crest. [de-identified] : The patient is a 78 year female who returns for the 1st postoperative visit after undergoing ORIF of right humeral nonunion with anterior plate, lateral plate, and also medial femoral allograft and 30 mL cancellous chips with 10 mL of bone marrow aspirate from right iliac crest at [facility]. The surgery was on 01/26/2024. The patient is recovering at home. She reports mild postoperative pain. [de-identified] : Patient is WDWN, alert, and in no acute distress. Breathing is unlabored. She is grossly oriented to person, place, and time.   Right Shoulder:   Incision is healing well. There are no signs of infection. Sutures were removed. Normal amount of postoperative edema and tenderness present. [de-identified] :  AP and lateral views of the Right humerus were obtained today and revealed nonunion humeral shaft which is well aligned with Synthes 18-hole 3.5 LC-DC plate and Synthes 14-hole 3.5 LC-DC plate with a 30 cm fresh frozen femoral strut graft which was placed medially. Hardware is intact. Fracture is well aligned. [de-identified] :  The underlying pathophysiology was reviewed with the patient. XR films were reviewed with the patient. Discussed at length the nature of the patient's condition.   Patient was advised to take OTC medications and topical analgesic for pain management.  The splint was removed today she was informed that it will take time for improvement on her ROM.   All questions answered, understanding verbalized. Patient in agreement with plan of care.   Patient was advised to follow up in a month.

## 2024-02-20 ENCOUNTER — APPOINTMENT (OUTPATIENT)
Dept: ORTHOPEDIC SURGERY | Facility: CLINIC | Age: 79
End: 2024-02-20
Payer: MEDICARE

## 2024-02-20 PROCEDURE — 73060 X-RAY EXAM OF HUMERUS: CPT | Mod: RT

## 2024-02-20 PROCEDURE — 99024 POSTOP FOLLOW-UP VISIT: CPT

## 2024-02-20 NOTE — END OF VISIT
[FreeTextEntry3] : All medical record entries made by the scribe, were at my, Dr. Keith Martinez MD., direction and personally dictated by me on 02/20/2024. I have personally reviewed the chart and agree that the record accurately reflects my personal performance of the history, physical exam, assessment and plan.

## 2024-02-20 NOTE — ADDENDUM
[FreeTextEntry1] : I, Tabitha Roman, wrote this note acting as a scribe for Dr. Keith Martinez on Feb 20, 2024. I, Tabitha Roman, wrote this note acting as a scribe for Dr. Keith Martinez on Feb 20, 2024.

## 2024-02-20 NOTE — HISTORY OF PRESENT ILLNESS
[de-identified] : DOS: 01/26/2024 Procedure: ORIF Right Humerus Nonunion [de-identified] : The patient is a 78 year female who returns for the 2nd postoperative visit after undergoing ORIF of right humeral nonunion with anterior plate, lateral plate, and also medial femoral allograft and 30 mL cancellous chips with 10 mL of bone marrow aspirate from right iliac crest at [facility]. The surgery was on 01/26/2024. The patient is recovering at home. She reports severe postoperative pain, bleeding, and swelling, that worsened since last visit on 02/08/2024. [de-identified] : Patient is WDWN, alert, and in no acute distress. Breathing is unlabored. She is grossly oriented to person, place, and time.   Right Shoulder:  Incision is healing well. There are no signs of infection. Normal amount of postoperative edema and tenderness present. [de-identified] : AP and lateral views of the Right humerus were obtained today and revealed nonunion humeral shaft which is well aligned with Synthes 18-hole 3.5 LC-DC plate and Synthes 14-hole 3.5 LC-DC plate with a 30 cm fresh frozen femoral strut graft which was placed medially. Hardware is intact. Fracture is well aligned. [de-identified] : The underlying pathophysiology was reviewed with the patient. XR films were reviewed with the patient. Discussed at length the nature of the patient's condition.   Patient was advised to take OTC medications and topical analgesic for pain management.  She was provided an arm sling for comfort.   All questions answered, understanding verbalized. Patient in agreement with plan of care.   Patient was advised to follow up in a month.

## 2024-03-14 ENCOUNTER — APPOINTMENT (OUTPATIENT)
Dept: ORTHOPEDIC SURGERY | Facility: CLINIC | Age: 79
End: 2024-03-14
Payer: MEDICARE

## 2024-03-14 VITALS — BODY MASS INDEX: 19.15 KG/M2 | HEIGHT: 59 IN | WEIGHT: 95 LBS

## 2024-03-14 PROCEDURE — 99024 POSTOP FOLLOW-UP VISIT: CPT

## 2024-03-14 PROCEDURE — 73060 X-RAY EXAM OF HUMERUS: CPT

## 2024-05-02 ENCOUNTER — APPOINTMENT (OUTPATIENT)
Dept: ORTHOPEDIC SURGERY | Facility: CLINIC | Age: 79
End: 2024-05-02
Payer: MEDICARE

## 2024-05-02 VITALS — WEIGHT: 91 LBS | HEIGHT: 58 IN | BODY MASS INDEX: 19.1 KG/M2

## 2024-05-02 DIAGNOSIS — S42.309A UNSPECIFIED FRACTURE OF SHAFT OF HUMERUS, UNSPECIFIED ARM, INITIAL ENCOUNTER FOR CLOSED FRACTURE: ICD-10-CM

## 2024-05-02 PROCEDURE — 73060 X-RAY EXAM OF HUMERUS: CPT

## 2024-05-02 PROCEDURE — 99213 OFFICE O/P EST LOW 20 MIN: CPT

## 2024-08-19 ENCOUNTER — APPOINTMENT (OUTPATIENT)
Dept: ORTHOPEDIC SURGERY | Facility: CLINIC | Age: 79
End: 2024-08-19
Payer: MEDICARE

## 2024-08-19 VITALS — HEIGHT: 58 IN | WEIGHT: 91 LBS | BODY MASS INDEX: 19.1 KG/M2

## 2024-08-19 DIAGNOSIS — S42.309A UNSPECIFIED FRACTURE OF SHAFT OF HUMERUS, UNSPECIFIED ARM, INITIAL ENCOUNTER FOR CLOSED FRACTURE: ICD-10-CM

## 2024-08-19 PROCEDURE — 73060 X-RAY EXAM OF HUMERUS: CPT

## 2024-08-19 PROCEDURE — 99213 OFFICE O/P EST LOW 20 MIN: CPT

## 2024-08-19 NOTE — DISCUSSION/SUMMARY
[de-identified] : The underlying pathophysiology was reviewed with the patient. XR films were reviewed with the patient. Discussed at length the nature of the patient's condition.  Patient was advised to take OTC medications and topical analgesic for pain management.  She was informed that she was returned to her baseline tasks as tolerated.   The patient was encouraged to continue with physical therapy and exercise as tolerated.  All questions answered, understanding verbalized. Patient in agreement with plan of care.  If the patient begins to experience any changes or severe exacerbation of her symptoms, she should reach out to me as soon as possible. Otherwise, she should return to me as needed.

## 2024-08-19 NOTE — HISTORY OF PRESENT ILLNESS
[de-identified] : The patient is a 78 year female who returns after undergoing ORIF of right humeral nonunion with anterior plate, lateral plate, and also medial femoral allograft and 30 mL cancellous chips with 10 mL of bone marrow aspirate from right iliac crest at [facility]. The surgery was on 01/26/2024. The patient is recovering at home. 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  Today, Aug 19, 2024, the patient presents for follow-up and further management. She has no significant pain present. The patient reports no significant changes to their symptoms since their last visit. She reports she is doing well and currently attends physical therapy.

## 2024-08-19 NOTE — REASON FOR VISIT
[Post Operative Visit] : a post operative visit for [FreeTextEntry2] : s/p  ORIF of right humeral nonunion with anterior plate, lateral plate, and also medial femoral allograft and 30 mL cancellous chips with 10 mL of bone marrow aspirate from right iliac crest

## 2024-08-19 NOTE — ADDENDUM
[FreeTextEntry1] : I, Brendan Gatson Jr, acted solely as a scribe for Dr. Keith Martinez on this date 08/19/2024  All medical record entries made by the Scribe were at my, Dr. Keith Martinez, direction and personally dictated by me on 08/19/2024 . I have reviewed the chart and agree that the record accurately reflects my personal performance of the history, physical exam, assessment and plan. I have also personally directed, reviewed, and agreed with the chart.

## 2024-08-19 NOTE — PHYSICAL EXAM
[de-identified] : Patient is WDWN, alert, and in no acute distress. Breathing is unlabored. She is grossly oriented to person, place, and time.  She was accompanied by Her  today. Patient ambulates with a walker.  Right Shoulder:  no tenderness is present no edema is present full rotation   sensation is intact ROM:  [de-identified] : AP and lateral views of the Right humerus were obtained today and revealed nonunion humeral shaft which is well aligned with Synthes 18-hole 3.5 LC-DC plate and Synthes 14-hole 3.5 LC-DC plate with a 30 cm fresh frozen femoral strut graft which was placed medially. Hardware is intact and the position remains unchanged. Fracture is well aligned.

## (undated) DEVICE — SUT ETHIBOND 2-0 30" RB-1

## (undated) DEVICE — SUT MONOCRYL 4-0 18" P-3 UNDYED

## (undated) DEVICE — ELCTR STRYKER NEPTUNE SMOKE EVACUATION PENCIL (GREEN)

## (undated) DEVICE — SUT FIBERWIRE #2 38" STRAND 1 BLUE 1 WHITE/BLACK

## (undated) DEVICE — DRAPE C ARM UNIVERSAL

## (undated) DEVICE — DRILL BIT SYNTHES ORTHO QC 2.5X110MM

## (undated) DEVICE — GLV 7.5 PROTEXIS (WHITE)

## (undated) DEVICE — SUT FIBERWIRE #2 38" STRAND 1 BLUE T-5 TAPER

## (undated) DEVICE — DRSG CURITY GAUZE SPONGE 4 X 4" 12-PLY

## (undated) DEVICE — SYR ASEPTO

## (undated) DEVICE — DRSG TEGADERM 4X10"

## (undated) DEVICE — DRSG WEBRIL 4"

## (undated) DEVICE — DRAPE MAYO STAND 23"

## (undated) DEVICE — BLADE SCALPEL SAFETYLOCK #10

## (undated) DEVICE — PACK TOTAL HIP (2 PACKS)

## (undated) DEVICE — DRSG MASTISOL

## (undated) DEVICE — SLING ARM CHIEFTAIN MESH LARGE

## (undated) DEVICE — DRAPE TOP SHEET 53" X 101"

## (undated) DEVICE — DRSG XEROFORM 5 X 9"

## (undated) DEVICE — WARMING BLANKET LOWER ADULT

## (undated) DEVICE — SAW BLADE STRYKER SAGITTAL OSCILLATING 9X31X0.51MM

## (undated) DEVICE — SUT VICRYL 3-0 27" RB-1 UNDYED

## (undated) DEVICE — DRAPE TOWEL BLUE 17" X 24"

## (undated) DEVICE — DRAPE SPLIT SHEET 77" X 120"

## (undated) DEVICE — GLV 8 PROTEXIS (BLUE)

## (undated) DEVICE — SUT NDL MAYO CATGUT 1/2 CIRCLE TAPER POINT 0.050" X 1.092"

## (undated) DEVICE — DRAPE C ARM 41X140"

## (undated) DEVICE — SUT ORTHOCORD 2 36"

## (undated) DEVICE — TAPE SILK 3"

## (undated) DEVICE — DRSG KLING 2"

## (undated) DEVICE — VENODYNE/SCD SLEEVE CALF MEDIUM

## (undated) DEVICE — DRSG ACE BANDAGE 4"

## (undated) DEVICE — DRILL BIT SYNTHES ORTHO QC 3FLUTE 2.7X125MM

## (undated) DEVICE — DRSG ACE BANDAGE 4" NS

## (undated) DEVICE — DRSG STERISTRIPS 0.5 X 4"